# Patient Record
Sex: MALE | Race: OTHER | Employment: FULL TIME | URBAN - METROPOLITAN AREA
[De-identification: names, ages, dates, MRNs, and addresses within clinical notes are randomized per-mention and may not be internally consistent; named-entity substitution may affect disease eponyms.]

---

## 2024-02-25 ENCOUNTER — HOSPITAL ENCOUNTER (EMERGENCY)
Facility: HOSPITAL | Age: 44
Discharge: HOME | End: 2024-02-25
Attending: EMERGENCY MEDICINE | Admitting: EMERGENCY MEDICINE

## 2024-02-25 VITALS
HEART RATE: 90 BPM | DIASTOLIC BLOOD PRESSURE: 84 MMHG | RESPIRATION RATE: 18 BRPM | OXYGEN SATURATION: 97 % | TEMPERATURE: 98.3 F | SYSTOLIC BLOOD PRESSURE: 157 MMHG

## 2024-02-25 DIAGNOSIS — H10.30 ACUTE CONJUNCTIVITIS: ICD-10-CM

## 2024-02-25 DIAGNOSIS — R68.89 FLU-LIKE SYMPTOMS: Primary | ICD-10-CM

## 2024-02-25 LAB
FLUAV RNA RESP QL NAA+PROBE: NEGATIVE
FLUBV RNA RESP QL NAA+PROBE: NEGATIVE
RSV RNA RESP QL NAA+PROBE: NEGATIVE
SARS-COV-2 RNA RESP QL NAA+PROBE: NEGATIVE

## 2024-02-25 PROCEDURE — 0241U HB NFCT DS VIR RESP RNA 4 TRGT: CPT

## 2024-02-25 PROCEDURE — 99283 EMERGENCY DEPT VISIT LOW MDM: CPT

## 2024-02-25 PROCEDURE — 99284 EMERGENCY DEPT VISIT MOD MDM: CPT | Performed by: EMERGENCY MEDICINE

## 2024-02-25 RX ORDER — ERYTHROMYCIN 5 MG/G
OINTMENT OPHTHALMIC ONCE
Status: COMPLETED | OUTPATIENT
Start: 2024-02-25 | End: 2024-02-25

## 2024-02-25 RX ADMIN — ERYTHROMYCIN: 5 OINTMENT OPHTHALMIC at 14:54

## 2024-02-25 NOTE — DISCHARGE INSTRUCTIONS
Apply thin ribbon of eye ointment in each eye every 4 hours for the next 4 to 5 days.  You will be contacted for the results of the flu test.  Return sooner to the ED if you experience worsening symptoms.

## 2024-02-25 NOTE — ED PROVIDER NOTES
History  Chief Complaint   Patient presents with    Eye Redness     Patient reports left eye redness since this morning with yellow drainage.    Cold Like Symptoms     Patient also reports cough x2 days and sore throat since yesterday     (Og Smalls) Og Smalls is a 43 y.o. male who identifies as male    They presented to the emergency department on February 25, 2024. Patient presents with:  Eye Redness: Patient reports left eye redness since this morning with yellow drainage.  Cold Like Symptoms: Patient also reports cough x2 days and sore throat since yesterday  .    The patient states that symptoms started 4 days ago with cough, congestion, runny nose.  Patient's family member were sick a week prior.  2 days ago, patient reported sore throat, woke up this morning with painful pink left eye  with yellow crusting. Patient denies fever, nausea, vomiting, diarrhea, urinary symptoms, or any other complaint at this time.      Allergies include:  No Known Allergies      Immunizations:    There is no immunization history on file for this patient.  Immunizations Reviewed.          None       History reviewed. No pertinent past medical history.    Past Surgical History:   Procedure Laterality Date    CT NEEDLE BIOPSY LYMPH NODE  12/24/2018       History reviewed. No pertinent family history.  I have reviewed and agree with the history as documented.    E-Cigarette/Vaping     E-Cigarette/Vaping Substances     Social History     Tobacco Use    Smoking status: Unknown        Review of Systems   Constitutional:  Negative for chills and fever.   HENT:  Positive for congestion and sore throat. Negative for ear pain.    Eyes:  Positive for redness (Left). Negative for pain and visual disturbance.   Respiratory:  Positive for cough. Negative for shortness of breath.    Cardiovascular:  Negative for chest pain and palpitations.   Gastrointestinal:  Negative for abdominal pain and vomiting.   Genitourinary:  Negative  for dysuria and hematuria.   Musculoskeletal:  Negative for arthralgias and back pain.   Skin:  Negative for color change and rash.   Neurological:  Negative for seizures and syncope.   All other systems reviewed and are negative.      Physical Exam  ED Triage Vitals [02/25/24 1322]   Temperature Pulse Respirations Blood Pressure SpO2   98.3 °F (36.8 °C) 90 18 157/84 97 %      Temp Source Heart Rate Source Patient Position - Orthostatic VS BP Location FiO2 (%)   Oral Monitor Sitting Right arm --      Pain Score       No Pain             Orthostatic Vital Signs  Vitals:    02/25/24 1322   BP: 157/84   Pulse: 90   Patient Position - Orthostatic VS: Sitting       Physical Exam  Vitals and nursing note reviewed.   Constitutional:       General: He is not in acute distress.     Appearance: He is well-developed.   HENT:      Head: Normocephalic and atraumatic.      Nose: Congestion present.   Eyes:      General:         Left eye: Discharge (Watery) present.     Conjunctiva/sclera: Conjunctivae normal.        Comments: Left conjunctival injection present   Cardiovascular:      Rate and Rhythm: Normal rate and regular rhythm.      Heart sounds: No murmur heard.  Pulmonary:      Effort: Pulmonary effort is normal. No respiratory distress.      Breath sounds: Normal breath sounds.   Abdominal:      Palpations: Abdomen is soft.      Tenderness: There is no abdominal tenderness.   Musculoskeletal:         General: No swelling.      Cervical back: Neck supple.   Skin:     General: Skin is warm and dry.      Capillary Refill: Capillary refill takes less than 2 seconds.   Neurological:      Mental Status: He is alert.   Psychiatric:         Mood and Affect: Mood normal.         ED Medications  Medications   erythromycin (ILOTYCIN) 0.5 % ophthalmic ointment ( Both Eyes Given 2/25/24 2011)       Diagnostic Studies  Results Reviewed       Procedure Component Value Units Date/Time    FLU/RSV/COVID - if FLU/RSV clinically relevant  [[id number]]  (Normal) Collected: 02/25/24 1454    Lab Status: Final result Specimen: Nares from Nose Updated: 02/25/24 1546     SARS-CoV-2 Negative     INFLUENZA A PCR Negative     INFLUENZA B PCR Negative     RSV PCR Negative    Narrative:      FOR PEDIATRIC PATIENTS - copy/paste COVID Guidelines URL to browser: [email or web address]    SARS-CoV-2 assay is a Nucleic Acid Amplification assay intended for the  qualitative detection of nucleic acid from SARS-CoV-2 in nasopharyngeal  swabs. Results are for the presumptive identification of SARS-CoV-2 RNA.    Positive results are indicative of infection with SARS-CoV-2, the virus  causing COVID-19, but do not rule out bacterial infection or co-infection  with other viruses. Laboratories within the [place] and its  territories are required to report all positive results to the appropriate  public health authorities. Negative results do not preclude SARS-CoV-2  infection and should not be used as the sole basis for treatment or other  patient management decisions. Negative results must be combined with  clinical observations, patient history, and epidemiological information.  This test has not been FDA cleared or approved.    This test has been authorized by FDA under an Emergency Use Authorization  (EUA). This test is only authorized for the duration of time the  declaration that circumstances exist justifying the authorization of the  emergency use of an in vitro diagnostic tests for detection of SARS-CoV-2  virus and/or diagnosis of COVID-19 infection under section 564(b)(1) of  the Act, 21 U.S.C. 360bbb-3(b)(1), unless the authorization is terminated  or revoked sooner. The test has been validated but independent review by FDA  and CLIA is pending.    Test performed using [employer] GeneXpert: This RT-PCR assay targets N2,  a region unique to SARS-CoV-2. A conserved region in the E-gene was chosen  for  pan-Sarbecovirus detection which includes SARS-CoV-2.    According to CMS-2020-01-R, this platform meets the definition of high-throughput technology.                   No orders to display         Procedures  Procedures      ED Course  ED Course as of 02/25/24 2345   Sun Feb 25, 2024   2301 43-year-old male presented for evaluation of flulike symptoms and pinkeye for the past 4 days.                                       Medical Decision Making  Patient presents with:  Eye Redness: Patient reports left eye redness since this morning with yellow drainage.  Cold Like Symptoms: Patient also reports cough x2 days and sore throat since yesterday      Patient seen and examined noted to have congestion, cough, pinkeye.      Temp:  [98.3 °F (36.8 °C)] 98.3 °F (36.8 °C)  HR:  [90] 90  Resp:  [18] 18  BP: (157)/(84) 157/84    Differential diagnosis includes but is not limited to COVID flu/RSV, conjunctivitis among others.      Due to patient's history and presentation the following laboratory evidence was collected:  Recent Results (from the past 12 hour(s))  -FLU/RSV/COVID - if FLU/RSV clinically relevant:   Collection Time: 02/25/24  2:54 PM  Specimen: Nose; Nares       Result                      Value             Ref Range           SARS-CoV-2                  Negative          Negative            INFLUENZA A PCR             Negative          Negative            INFLUENZA B PCR             Negative          Negative            RSV PCR                     Negative          Negative         Patient was administered:      Medication Administration - last 24 hours from 02/24/2024 2258 to   02/25/2024 2258       Date/Time Order Dose Route Action Action by     02/25/2024 1454 EST erythromycin (ILOTYCIN) 0.5 % ophthalmic ointment --   Both Eyes Given Jenna Oglesby RN        Patient appears well, is nontoxic appearing, patient expresses understanding and agrees with plan of care at this time.  In light of this patient would  benefit from outpatient management.      Patient was rx'd: Erythromycin ointment for conjunctivitis.  Return precautions discussed.        Amount and/or Complexity of Data Reviewed  Independent Historian: spouse    Risk  Prescription drug management.          Disposition  Final diagnoses:   Flu-like symptoms   Acute conjunctivitis     Time reflects when diagnosis was documented in both MDM as applicable and the Disposition within this note       Time User Action Codes Description Comment    2/25/2024  2:50 PM Ananth Alfredo [R68.89] Flu-like symptoms     2/25/2024  2:50 PM Ananth Alfredo Add [H10.30] Acute conjunctivitis           ED Disposition       ED Disposition   Discharge    Condition   Stable    Date/Time   Sun Feb 25, 2024  2:55 PM    Comment   Og Smalls discharge to home/self care.                   Follow-up Information    None         There are no discharge medications for this patient.    No discharge procedures on file.    PDMP Review       None             ED Provider  Attending physically available and evaluated Og Smalls. I managed the patient along with the ED Attending.    Electronically Signed by           Ananth Alfredo MD  02/25/24 8267       Ananth Alfredo MD  02/25/24 1397

## 2024-02-25 NOTE — ED ATTENDING ATTESTATION
2/25/2024  IGabriela DO, saw and evaluated the patient. I have discussed the patient with the resident/non-physician practitioner and agree with the resident's/non-physician practitioner's findings, Plan of Care, and MDM as documented in the resident's/non-physician practitioner's note, except where noted. All available labs and Radiology studies were reviewed.  I was present for key portions of any procedure(s) performed by the resident/non-physician practitioner and I was immediately available to provide assistance.       At this point I agree with the current assessment done in the Emergency Department.  I have conducted an independent evaluation of this patient a history and physical is as follows:    ED Course  43 year old male presents with his significant other, daughter and 14 month old son for evaluation of sore throat, cough, and left eye irritation.  All 4 family members are being evaluated for similar symptoms.  Patient reports paiful swallowing x 3 days, cough is dry non-productive.  NO fever.  No chest pain or SOB.  Today patient woke with left eye redness, irritation and drainage.  No contact use.      PmhX:  sinusitis, chronic cough    Physical exam: Patient is awake and alert, no acute distress.  Resting comfortably.  Pupils are equal and reactive.  Neck is supple, pharynx without exudate.  Heart is regular rate and rhythm without ectopy.  Lungs are clear to auscultation, no wheeze or rhonchi.  Cough noted.  Chest wall is nontender to palpation.  Abdomen is soft, nontender, nondistended with normal active bowel sounds.  No lower extremity edema.  No focal motor or sensory deficits.  Speech is clear and appropriate  Assessment/plan: 43-year-old male presents with 3-day history of URI symptoms of sore throat and cough, new onset of left eye irritation.  Differential diagnosis includes was not limited to acute viral syndrome, COVID/flu/RSV, viral or bacterial conjunctivitis, allergic  conjunctivitis.    Will treat empirically for acute conjunctivitis with topical antibiotic, will check for COVID/flu/RSV.  Patient is out of the window for treatment with Tamiflu.  Patient is nontoxic and afebrile.  Recommend supportive care with cough medicine, over-the-counter medications.  Discussed signs symptoms to return to the emergency department.  Critical Care Time  Procedures